# Patient Record
Sex: MALE | Race: WHITE | NOT HISPANIC OR LATINO
[De-identification: names, ages, dates, MRNs, and addresses within clinical notes are randomized per-mention and may not be internally consistent; named-entity substitution may affect disease eponyms.]

---

## 2022-09-15 PROBLEM — Z00.00 ENCOUNTER FOR PREVENTIVE HEALTH EXAMINATION: Status: ACTIVE | Noted: 2022-09-15

## 2022-09-21 ENCOUNTER — RESULT REVIEW (OUTPATIENT)
Age: 52
End: 2022-09-21

## 2022-09-21 ENCOUNTER — APPOINTMENT (OUTPATIENT)
Dept: HEMATOLOGY ONCOLOGY | Facility: CLINIC | Age: 52
End: 2022-09-21

## 2022-09-21 VITALS
HEIGHT: 65 IN | BODY MASS INDEX: 32.99 KG/M2 | OXYGEN SATURATION: 99 % | HEART RATE: 69 BPM | RESPIRATION RATE: 18 BRPM | SYSTOLIC BLOOD PRESSURE: 142 MMHG | DIASTOLIC BLOOD PRESSURE: 89 MMHG | WEIGHT: 198 LBS | TEMPERATURE: 98.2 F

## 2022-09-21 DIAGNOSIS — I10 ESSENTIAL (PRIMARY) HYPERTENSION: ICD-10-CM

## 2022-09-21 DIAGNOSIS — Z78.9 OTHER SPECIFIED HEALTH STATUS: ICD-10-CM

## 2022-09-21 DIAGNOSIS — M89.9 DISORDER OF BONE, UNSPECIFIED: ICD-10-CM

## 2022-09-21 DIAGNOSIS — N52.9 MALE ERECTILE DYSFUNCTION, UNSPECIFIED: ICD-10-CM

## 2022-09-21 DIAGNOSIS — Z80.42 FAMILY HISTORY OF MALIGNANT NEOPLASM OF PROSTATE: ICD-10-CM

## 2022-09-21 DIAGNOSIS — C61 MALIGNANT NEOPLASM OF PROSTATE: ICD-10-CM

## 2022-09-21 PROCEDURE — 99205 OFFICE O/P NEW HI 60 MIN: CPT | Mod: 25

## 2022-09-21 PROCEDURE — 36415 COLL VENOUS BLD VENIPUNCTURE: CPT

## 2022-09-21 RX ORDER — AMLODIPINE BESYLATE 5 MG/1
5 TABLET ORAL
Refills: 0 | Status: ACTIVE | COMMUNITY

## 2022-09-22 PROBLEM — M89.9 LESION OF BONE OF THORACIC SPINE: Status: ACTIVE | Noted: 2022-09-22

## 2022-09-22 NOTE — ASSESSMENT
[FreeTextEntry1] : Prostate cancer \par Diagnosed in 2010 s/p robotic radical prostatectomy by NORBERTO Telles. Does not remember the Matias score, stage, risk etc\par Switched his care to Dr Toney [place]-> Had rising PSA and in 2017 was placed on lupron x 12 month\par Last lupron was in 2018 when he started following with Dr Garland.\par [facility]/CT (9/2022) - increased activity in T2 vertebral body, maximum SUV 7.2. The abnormality is associated with a 9 mm sclerotic lesion raising the possibly of a solitary bony mets. \par \par Outside Labs, notes imaging reviewed, analyzed and discussed\par He feels good overall and denies any symptoms\par Discussed at length about the diagnosis, work up, staging, prognosis and treatment options\par Given the new bone lesion and slowly rising PSA- recommend ADT. 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 I think it will help especially if he has BRCA mutation in which case he may be a candidate for PARPi\par \par Patient had multiple questions which were answered to satisfaction\par \par Follow up in a few weeks to review findings\par NO labs

## 2022-09-22 NOTE — HISTORY OF PRESENT ILLNESS
[de-identified] :  Mr. Chino Ramirez is 51 year old male with prostate cancer here for consultation. \par Accompanied by his spouse Daly\par \par Patient with history of HTN who was diagnosed with prostate cancer in 2010 s/p radical prostatectomy. In 2017, he's started on Lupron for about 2 yeas due to elevated PSA in [place]. He was then transferred care Dr. Garland who stopped his Lupron and just followed up with PSA. His PSA gradually went up. \par \par 8/2022 PSA 1.0 \par 9/2/22 - PSA 1.0 \par \par 9/13/2/2022 [facility]/CT - increased activity in T2 vertebral body, maximum SUV 7.2. The abnormality is associated with a 9 mm sclerotic lesion raising the possibly of a solitary bony mets. \par \par FHx:\par Father with prostate cancer at age 56 and sickle cell disease.\par M. Grandmother with lung cancer in her 60s (smoker)\par \par SHx:\par never smoked\par alcohol ingestions\par Lives with his wife\par Drives truck\par \par Colonoscopy \par 2017 -  repeat in five years. \par \par Denies  symptoms\par Denies hot flashes \par \par

## 2022-09-22 NOTE — RESULTS/DATA
[FreeTextEntry1] : Outside Labs, notes imaging reviewed, analyzed and discussed\par \par PSA\par 0.007 (3/2011) -> 0.2 (11/2019) -> 0.6 (8/2020) -> 0.8 (2/2021) -> 1 (8/2022) -> 1.13 (today)

## 2022-10-13 ENCOUNTER — APPOINTMENT (OUTPATIENT)
Dept: HEMATOLOGY ONCOLOGY | Facility: CLINIC | Age: 52
End: 2022-10-13

## 2022-10-13 ENCOUNTER — APPOINTMENT (OUTPATIENT)
Dept: RADIATION ONCOLOGY | Facility: CLINIC | Age: 52
End: 2022-10-13